# Patient Record
Sex: FEMALE | Race: WHITE | NOT HISPANIC OR LATINO | Employment: OTHER | URBAN - METROPOLITAN AREA
[De-identification: names, ages, dates, MRNs, and addresses within clinical notes are randomized per-mention and may not be internally consistent; named-entity substitution may affect disease eponyms.]

---

## 2021-02-13 DIAGNOSIS — Z23 ENCOUNTER FOR IMMUNIZATION: ICD-10-CM

## 2021-02-23 ENCOUNTER — IMMUNIZATIONS (OUTPATIENT)
Dept: FAMILY MEDICINE CLINIC | Facility: HOSPITAL | Age: 71
End: 2021-02-23

## 2021-02-23 DIAGNOSIS — Z23 ENCOUNTER FOR IMMUNIZATION: Primary | ICD-10-CM

## 2021-02-23 PROCEDURE — 0001A SARS-COV-2 / COVID-19 MRNA VACCINE (PFIZER-BIONTECH) 30 MCG: CPT

## 2021-02-23 PROCEDURE — 91300 SARS-COV-2 / COVID-19 MRNA VACCINE (PFIZER-BIONTECH) 30 MCG: CPT

## 2021-03-16 ENCOUNTER — IMMUNIZATIONS (OUTPATIENT)
Dept: FAMILY MEDICINE CLINIC | Facility: HOSPITAL | Age: 71
End: 2021-03-16

## 2021-03-16 DIAGNOSIS — Z23 ENCOUNTER FOR IMMUNIZATION: Primary | ICD-10-CM

## 2021-03-16 PROCEDURE — 91300 SARS-COV-2 / COVID-19 MRNA VACCINE (PFIZER-BIONTECH) 30 MCG: CPT

## 2021-03-16 PROCEDURE — 0002A SARS-COV-2 / COVID-19 MRNA VACCINE (PFIZER-BIONTECH) 30 MCG: CPT

## 2021-05-09 ENCOUNTER — HOSPITAL ENCOUNTER (EMERGENCY)
Facility: HOSPITAL | Age: 71
Discharge: HOME | End: 2021-05-09
Attending: EMERGENCY MEDICINE | Admitting: EMERGENCY MEDICINE
Payer: COMMERCIAL

## 2021-05-09 ENCOUNTER — APPOINTMENT (EMERGENCY)
Dept: CT IMAGING | Facility: HOSPITAL | Age: 71
End: 2021-05-09
Payer: COMMERCIAL

## 2021-05-09 VITALS
SYSTOLIC BLOOD PRESSURE: 177 MMHG | WEIGHT: 293 LBS | HEART RATE: 92 BPM | TEMPERATURE: 98.1 F | HEIGHT: 67 IN | BODY MASS INDEX: 45.99 KG/M2 | DIASTOLIC BLOOD PRESSURE: 74 MMHG | RESPIRATION RATE: 18 BRPM | OXYGEN SATURATION: 98 %

## 2021-05-09 DIAGNOSIS — K59.00 CONSTIPATION: ICD-10-CM

## 2021-05-09 DIAGNOSIS — N12 PYELONEPHRITIS: Primary | ICD-10-CM

## 2021-05-09 LAB
ALBUMIN SERPL BCP-MCNC: 3.8 G/DL (ref 3.5–5)
ALP SERPL-CCNC: 74 U/L (ref 46–116)
ALT SERPL W P-5'-P-CCNC: 31 U/L (ref 12–78)
ANION GAP SERPL CALCULATED.3IONS-SCNC: 7 MMOL/L (ref 4–13)
AST SERPL W P-5'-P-CCNC: 27 U/L (ref 5–45)
BACTERIA UR QL AUTO: ABNORMAL /HPF
BASOPHILS # BLD AUTO: 0.04 THOUSANDS/ΜL (ref 0–0.1)
BASOPHILS NFR BLD AUTO: 0 % (ref 0–1)
BILIRUB SERPL-MCNC: 0.58 MG/DL (ref 0.2–1)
BILIRUB UR QL STRIP: NEGATIVE
BUN SERPL-MCNC: 16 MG/DL (ref 5–25)
CALCIUM SERPL-MCNC: 9.8 MG/DL (ref 8.3–10.1)
CHLORIDE SERPL-SCNC: 99 MMOL/L (ref 100–108)
CLARITY UR: ABNORMAL
CO2 SERPL-SCNC: 31 MMOL/L (ref 21–32)
COLOR UR: ABNORMAL
CREAT SERPL-MCNC: 1.25 MG/DL (ref 0.6–1.3)
EOSINOPHIL # BLD AUTO: 0.23 THOUSAND/ΜL (ref 0–0.61)
EOSINOPHIL NFR BLD AUTO: 2 % (ref 0–6)
ERYTHROCYTE [DISTWIDTH] IN BLOOD BY AUTOMATED COUNT: 14.9 % (ref 11.6–15.1)
GFR SERPL CREATININE-BSD FRML MDRD: 43 ML/MIN/1.73SQ M
GLUCOSE SERPL-MCNC: 146 MG/DL (ref 65–140)
GLUCOSE UR STRIP-MCNC: NEGATIVE MG/DL
HCT VFR BLD AUTO: 39.5 % (ref 34.8–46.1)
HGB BLD-MCNC: 12.5 G/DL (ref 11.5–15.4)
HGB UR QL STRIP.AUTO: ABNORMAL
IMM GRANULOCYTES # BLD AUTO: 0.06 THOUSAND/UL (ref 0–0.2)
IMM GRANULOCYTES NFR BLD AUTO: 1 % (ref 0–2)
KETONES UR STRIP-MCNC: NEGATIVE MG/DL
LEUKOCYTE ESTERASE UR QL STRIP: ABNORMAL
LYMPHOCYTES # BLD AUTO: 1.45 THOUSANDS/ΜL (ref 0.6–4.47)
LYMPHOCYTES NFR BLD AUTO: 14 % (ref 14–44)
MCH RBC QN AUTO: 28.5 PG (ref 26.8–34.3)
MCHC RBC AUTO-ENTMCNC: 31.6 G/DL (ref 31.4–37.4)
MCV RBC AUTO: 90 FL (ref 82–98)
MONOCYTES # BLD AUTO: 0.78 THOUSAND/ΜL (ref 0.17–1.22)
MONOCYTES NFR BLD AUTO: 8 % (ref 4–12)
NEUTROPHILS # BLD AUTO: 7.48 THOUSANDS/ΜL (ref 1.85–7.62)
NEUTS SEG NFR BLD AUTO: 75 % (ref 43–75)
NITRITE UR QL STRIP: POSITIVE
NON-SQ EPI CELLS URNS QL MICRO: ABNORMAL /HPF
NRBC BLD AUTO-RTO: 0 /100 WBCS
PH UR STRIP.AUTO: 7 [PH] (ref 4.5–8)
PLATELET # BLD AUTO: 227 THOUSANDS/UL (ref 149–390)
PMV BLD AUTO: 10.3 FL (ref 8.9–12.7)
POTASSIUM SERPL-SCNC: 4.1 MMOL/L (ref 3.5–5.3)
PROT SERPL-MCNC: 8.9 G/DL (ref 6.4–8.2)
PROT UR STRIP-MCNC: ABNORMAL MG/DL
RBC # BLD AUTO: 4.38 MILLION/UL (ref 3.81–5.12)
RBC #/AREA URNS AUTO: ABNORMAL /HPF
SODIUM SERPL-SCNC: 137 MMOL/L (ref 136–145)
SP GR UR STRIP.AUTO: 1.01 (ref 1–1.03)
UROBILINOGEN UR QL STRIP.AUTO: 1 E.U./DL
WBC # BLD AUTO: 10.04 THOUSAND/UL (ref 4.31–10.16)
WBC #/AREA URNS AUTO: ABNORMAL /HPF

## 2021-05-09 PROCEDURE — 96365 THER/PROPH/DIAG IV INF INIT: CPT

## 2021-05-09 PROCEDURE — 96361 HYDRATE IV INFUSION ADD-ON: CPT

## 2021-05-09 PROCEDURE — 99284 EMERGENCY DEPT VISIT MOD MDM: CPT | Performed by: EMERGENCY MEDICINE

## 2021-05-09 PROCEDURE — 36415 COLL VENOUS BLD VENIPUNCTURE: CPT | Performed by: EMERGENCY MEDICINE

## 2021-05-09 PROCEDURE — 87086 URINE CULTURE/COLONY COUNT: CPT

## 2021-05-09 PROCEDURE — 87186 SC STD MICRODIL/AGAR DIL: CPT

## 2021-05-09 PROCEDURE — 74177 CT ABD & PELVIS W/CONTRAST: CPT

## 2021-05-09 PROCEDURE — 87040 BLOOD CULTURE FOR BACTERIA: CPT | Performed by: EMERGENCY MEDICINE

## 2021-05-09 PROCEDURE — 85025 COMPLETE CBC W/AUTO DIFF WBC: CPT | Performed by: EMERGENCY MEDICINE

## 2021-05-09 PROCEDURE — 99284 EMERGENCY DEPT VISIT MOD MDM: CPT

## 2021-05-09 PROCEDURE — 81001 URINALYSIS AUTO W/SCOPE: CPT

## 2021-05-09 PROCEDURE — 80053 COMPREHEN METABOLIC PANEL: CPT | Performed by: EMERGENCY MEDICINE

## 2021-05-09 PROCEDURE — 87077 CULTURE AEROBIC IDENTIFY: CPT

## 2021-05-09 PROCEDURE — G1004 CDSM NDSC: HCPCS

## 2021-05-09 RX ORDER — INSULIN GLARGINE 100 [IU]/ML
22 INJECTION, SOLUTION SUBCUTANEOUS
COMMUNITY

## 2021-05-09 RX ORDER — CEFPODOXIME PROXETIL 200 MG/1
200 TABLET, FILM COATED ORAL 2 TIMES DAILY
Qty: 20 TABLET | Refills: 0 | Status: SHIPPED | OUTPATIENT
Start: 2021-05-09 | End: 2021-05-19

## 2021-05-09 RX ORDER — CYCLOBENZAPRINE HCL 10 MG
10 TABLET ORAL 2 TIMES DAILY
COMMUNITY

## 2021-05-09 RX ORDER — AMITRIPTYLINE HYDROCHLORIDE 50 MG/1
50 TABLET, FILM COATED ORAL
COMMUNITY

## 2021-05-09 RX ORDER — DICYCLOMINE HCL 20 MG
20 TABLET ORAL ONCE
Status: COMPLETED | OUTPATIENT
Start: 2021-05-09 | End: 2021-05-09

## 2021-05-09 RX ORDER — FOLIC ACID/MULTIVIT,IRON,MINER .4-18-35
1 TABLET,CHEWABLE ORAL DAILY
COMMUNITY

## 2021-05-09 RX ORDER — DORZOLAMIDE HYDROCHLORIDE AND TIMOLOL MALEATE 20; 5 MG/ML; MG/ML
1 SOLUTION/ DROPS OPHTHALMIC 2 TIMES DAILY
COMMUNITY

## 2021-05-09 RX ORDER — VALSARTAN 160 MG/1
160 TABLET ORAL DAILY
COMMUNITY

## 2021-05-09 RX ORDER — LEVOTHYROXINE SODIUM 137 UG/1
CAPSULE ORAL
COMMUNITY

## 2021-05-09 RX ORDER — MULTIVITAMIN WITH IRON
100 TABLET ORAL DAILY
COMMUNITY

## 2021-05-09 RX ORDER — FUROSEMIDE 40 MG/1
40 TABLET ORAL 2 TIMES DAILY
COMMUNITY

## 2021-05-09 RX ORDER — DIPHENHYDRAMINE HYDROCHLORIDE 25 MG/1
TABLET ORAL
COMMUNITY

## 2021-05-09 RX ORDER — DOCUSATE SODIUM 100 MG/1
100 CAPSULE, LIQUID FILLED ORAL EVERY 12 HOURS
Qty: 60 CAPSULE | Refills: 0 | Status: SHIPPED | OUTPATIENT
Start: 2021-05-09 | End: 2021-06-08

## 2021-05-09 RX ORDER — OXYCODONE HYDROCHLORIDE AND ACETAMINOPHEN 5; 325 MG/1; MG/1
1 TABLET ORAL EVERY 4 HOURS PRN
COMMUNITY

## 2021-05-09 RX ORDER — LOTEPREDNOL ETABONATE 5 MG/ML
1 SUSPENSION/ DROPS OPHTHALMIC 2 TIMES DAILY
COMMUNITY

## 2021-05-09 RX ORDER — LATANOPROST 50 UG/ML
1 SOLUTION/ DROPS OPHTHALMIC
COMMUNITY

## 2021-05-09 RX ADMIN — IOHEXOL 100 ML: 350 INJECTION, SOLUTION INTRAVENOUS at 09:21

## 2021-05-09 RX ADMIN — POLYETHYLENE GLYCOL 3350, SODIUM SULFATE ANHYDROUS, SODIUM BICARBONATE, SODIUM CHLORIDE, POTASSIUM CHLORIDE 1000 ML: 236; 22.74; 6.74; 5.86; 2.97 POWDER, FOR SOLUTION ORAL at 12:29

## 2021-05-09 RX ADMIN — SODIUM CHLORIDE 1000 ML: 0.9 INJECTION, SOLUTION INTRAVENOUS at 09:06

## 2021-05-09 RX ADMIN — CEFTRIAXONE SODIUM 1000 MG: 10 INJECTION, POWDER, FOR SOLUTION INTRAVENOUS at 09:22

## 2021-05-09 RX ADMIN — DICYCLOMINE HYDROCHLORIDE 20 MG: 20 TABLET ORAL at 10:37

## 2021-05-09 NOTE — ED PROVIDER NOTES
History  Chief Complaint   Patient presents with    Constipation     Pt unable to have BM since Friday (was here then for same issue and had BM before being seen by provider and left)  Also c/o left flank pain    Flank Pain     HPI     80-year-old female with history of hypertension, diabetes, and chronic low back pain, presenting for evaluation of constipation  Patient states that she has been unable to have a normal bowel movement for the last several days which is very unusual for her  She had a small bowel movement 2 days ago and then another one this morning, both of which were round and hard  She feels like there is stool stuck in her rectum that she is unable to get out  Reports nausea but no vomiting  Reports intense discomfort to the left lower quadrant and left flank  No history of abdominal surgeries  No dysuria, frequency, or rectal bleeding  Denies vaginal bleeding or discharge  No fevers or chills  Of note, patient takes oxycodone daily chronically for her low back pain  Prior to Admission Medications   Prescriptions Last Dose Informant Patient Reported? Taking?    Biotin (Biotin 5000) 5 MG CAPS 2021 at Unknown time  Yes Yes   Sig: Take by mouth   Cholecalciferol (Vitamin D3) 50 MCG (2000 UT) CHEW 2021 at Unknown time Self Yes Yes   Sig: Chew   Levothyroxine Sodium 137 MCG CAPS 2021 at Unknown time Self Yes Yes   Sig: Take by mouth   Potassium 99 MG TABS 2021 at Unknown time Self Yes Yes   Sig: Take by mouth   amitriptyline (ELAVIL) 50 mg tablet 2021 at Unknown time Self Yes Yes   Sig: Take 50 mg by mouth daily at bedtime   apixaban (Eliquis) 5 mg 2021 at Unknown time Self Yes Yes   Sig: Take 5 mg by mouth 2 (two) times a day   cyclobenzaprine (FLEXERIL) 10 mg tablet 2021 at Unknown time Self Yes Yes   Sig: Take 10 mg by mouth 2 (two) times a day   dorzolamide-timolol (COSOPT) 22 3-6 8 MG/ML ophthalmic solution 2021 at Unknown time Self Yes Yes   Si drop 2 (two) times a day   furosemide (LASIX) 40 mg tablet 5/8/2021 at Unknown time Self Yes Yes   Sig: Take 40 mg by mouth 2 (two) times a day   insulin glargine (LANTUS) 100 units/mL subcutaneous injection 5/8/2021 at Unknown time Self Yes Yes   Sig: Inject 22 Units under the skin daily at bedtime   latanoprost (XALATAN) 0 005 % ophthalmic solution 5/8/2021 at Unknown time Self Yes Yes   Sig: Administer 1 drop to both eyes daily at bedtime   loteprednol etabonate (LOTEMAX) 0 5 % ophthalmic suspension 5/8/2021 at Unknown time  Yes Yes   Sig: Administer 1 drop to both eyes 2 (two) times a day   metFORMIN (GLUCOPHAGE) 500 mg tablet 5/8/2021 at Unknown time Self Yes Yes   Sig: Take 500 mg by mouth 2 (two) times a day with meals   metoprolol tartrate (LOPRESSOR) 25 mg tablet 5/8/2021 at Unknown time Self Yes Yes   Sig: Take 25 mg by mouth every 12 (twelve) hours   multivitamin-iron-minerals-folic acid (CENTRUM) chewable tablet 5/8/2021 at Unknown time Self Yes Yes   Sig: Chew 1 tablet daily   oxyCODONE-acetaminophen (PERCOCET) 5-325 mg per tablet 5/8/2021 at Unknown time Self Yes Yes   Sig: Take 1 tablet by mouth every 4 (four) hours as needed for moderate pain   pyridoxine (VITAMIN B6) 100 mg tablet 5/8/2021 at Unknown time Self Yes Yes   Sig: Take 100 mg by mouth daily   valsartan (DIOVAN) 160 mg tablet 5/8/2021 at Unknown time Self Yes Yes   Sig: Take 160 mg by mouth daily      Facility-Administered Medications: None       Past Medical History:   Diagnosis Date    Diabetes mellitus ([address] )     Hypertension        Past Surgical History:   Procedure Laterality Date    REPLACEMENT TOTAL KNEE Bilateral     TOTAL HIP ARTHROPLASTY Left        History reviewed  No pertinent family history  I have reviewed and agree with the history as documented      E-Cigarette/Vaping    E-Cigarette Use Never User      E-Cigarette/Vaping Substances     Social History     Tobacco Use    Smoking status: Never Smoker    Smokeless tobacco: Never Used   Substance Use Topics    Alcohol use: Never     Frequency: Never    Drug use: Never       Review of Systems   Constitutional: Negative for chills and fever  HENT: Negative for congestion  Eyes: Negative for visual disturbance  Respiratory: Negative for cough and shortness of breath  Cardiovascular: Negative for chest pain and leg swelling  Gastrointestinal: Positive for abdominal pain, constipation and nausea  Negative for blood in stool, diarrhea and vomiting  Genitourinary: Positive for flank pain (L flank)  Negative for dysuria, frequency, vaginal bleeding and vaginal discharge  Musculoskeletal: Negative for arthralgias, back pain, neck pain and neck stiffness  Skin: Negative for rash  Neurological: Negative for weakness, numbness and headaches  Psychiatric/Behavioral: Negative for agitation, behavioral problems and confusion  Physical Exam  Physical Exam  Constitutional:       General: She is not in acute distress  Appearance: She is well-developed  She is not diaphoretic  HENT:      Head: Normocephalic and atraumatic  Right Ear: External ear normal       Left Ear: External ear normal       Nose: Nose normal    Eyes:      Conjunctiva/sclera: Conjunctivae normal    Neck:      Musculoskeletal: Normal range of motion and neck supple  Cardiovascular:      Rate and Rhythm: Normal rate and regular rhythm  Heart sounds: Normal heart sounds  No murmur  No friction rub  No gallop  Pulmonary:      Effort: Pulmonary effort is normal  No respiratory distress  Breath sounds: Normal breath sounds  No wheezing or rales  Abdominal:      General: Bowel sounds are normal  There is no distension  Palpations: Abdomen is soft  Tenderness: There is abdominal tenderness (LLQ)  There is left CVA tenderness  There is no right CVA tenderness or guarding     Genitourinary:     Comments: No palpable stool in the rectal vault, no gross blood  Musculoskeletal: Normal range of motion  General: No deformity  Skin:     General: Skin is warm and dry  Neurological:      Mental Status: She is alert and oriented to person, place, and time  Motor: No abnormal muscle tone  Vital Signs  ED Triage Vitals [05/09/21 0607]   Temperature Pulse Respirations Blood Pressure SpO2   98 1 °F (36 7 °C) 97 18 135/95 99 %      Temp Source Heart Rate Source Patient Position - Orthostatic VS BP Location FiO2 (%)   Oral Monitor Lying Right arm --      Pain Score       --           Vitals:    05/09/21 0607 05/09/21 0800 05/09/21 1000 05/09/21 1230   BP: 135/95 (!) 182/109 (!) 184/88 (!) 177/74   Pulse: 97 88 80 92   Patient Position - Orthostatic VS: Lying Lying Lying Lying         Visual Acuity  Visual Acuity      Most Recent Value   L Pupil Size (mm)  3   R Pupil Size (mm)  3          ED Medications  Medications   sodium chloride 0 9 % bolus 1,000 mL (0 mL Intravenous Stopped 5/9/21 1106)   ceftriaxone (ROCEPHIN) 1 g/50 mL in dextrose IVPB (0 mg Intravenous Stopped 5/9/21 1037)   iohexol (OMNIPAQUE) 350 MG/ML injection (SINGLE-DOSE) 100 mL (100 mL Intravenous Given 5/9/21 0921)   dicyclomine (BENTYL) tablet 20 mg (20 mg Oral Given 5/9/21 1037)   polyethylene glycol (GOLYTELY) bowel prep 1,000 mL (1,000 mL Oral Given 5/9/21 1229)       Diagnostic Studies  Results Reviewed     Procedure Component Value Units Date/Time    Blood culture [[id number]] Collected: 05/09/21 0908    Lab Status: Preliminary result Specimen: Blood from Arm, Right Updated: 05/09/21 1301     Blood Culture Received in Microbiology Lab  Culture in Progress  Blood culture [[id number]] Collected: 05/09/21 4180    Lab Status: Preliminary result Specimen: Blood from Arm, Right Updated: 05/09/21 1301     Blood Culture Received in Microbiology Lab  Culture in Progress      Urine Microscopic [[id number]]  (Abnormal) Collected: 05/09/21 0752    Lab Status: Final result Specimen: Urine, Clean Catch Updated: 05/09/21 0836     RBC, UA 0-1 /hpf      WBC, UA Innumerable /hpf      Epithelial Cells Occasional /hpf      Bacteria, UA Innumerable /hpf     Urine culture [[id number]] Collected: 05/09/21 0752    Lab Status:  In process Specimen: Urine, Clean Catch Updated: 05/09/21 0836    Urine Macroscopic, POC [[id number]]  (Abnormal) Collected: 05/09/21 0752    Lab Status: Final result Specimen: Urine Updated: 05/09/21 0753     Color, UA Arlyn     Clarity, UA Cloudy     pH, UA 7 0     Leukocytes, UA Small     Nitrite, UA Positive     Protein, UA 30 (1+) mg/dl      Glucose, UA Negative mg/dl      Ketones, UA Negative mg/dl      Urobilinogen, UA 1 0 E U /dl      Bilirubin, UA Negative     Blood, UA Trace     Specific [place], UA 1 015    Narrative:      CLINITEK RESULT    Comprehensive metabolic panel [[id number]]  (Abnormal) Collected: 05/09/21 0655    Lab Status: Final result Specimen: Blood from Arm, Right Updated: 05/09/21 0721     Sodium 137 mmol/L      Potassium 4 1 mmol/L      Chloride 99 mmol/L      CO2 31 mmol/L      ANION GAP 7 mmol/L      BUN 16 mg/dL      Creatinine 1 25 mg/dL      Glucose 146 mg/dL      Calcium 9 8 mg/dL      AST 27 U/L      ALT 31 U/L      Alkaline Phosphatase 74 U/L      Total Protein 8 9 g/dL      Albumin 3 8 g/dL      Total Bilirubin 0 58 mg/dL      eGFR 43 ml/min/1 73sq m     Narrative:      [place] guidelines for Chronic Kidney Disease (CKD):     Stage 1 with normal or high GFR (GFR > 90 mL/min/1 73 square meters)    Stage 2 Mild CKD (GFR = 60-89 mL/min/1 73 square meters)    Stage 3A Moderate CKD (GFR = 45-59 mL/min/1 73 square meters)    Stage 3B Moderate CKD (GFR = 30-44 mL/min/1 73 square meters)    Stage 4 Severe CKD (GFR = 15-29 mL/min/1 73 square meters)    Stage 5 End Stage CKD (GFR <15 mL/min/1 73 square meters)  Note: GFR calculation is accurate only with a steady state creatinine    CBC and differential [[id number]] Collected: 05/09/21 7930    Lab Status: Final result Specimen: Blood from Arm, Right Updated: 05/09/21 0701     WBC 10 04 Thousand/uL      RBC 4 38 Million/uL      Hemoglobin 12 5 g/dL      Hematocrit 39 5 %      MCV 90 fL      MCH 28 5 pg      MCHC 31 6 g/dL      RDW 14 9 %      MPV 10 3 fL      Platelets 788 Thousands/uL      nRBC 0 /100 WBCs      Neutrophils Relative 75 %      Immat GRANS % 1 %      Lymphocytes Relative 14 %      Monocytes Relative 8 %      Eosinophils Relative 2 %      Basophils Relative 0 %      Neutrophils Absolute 7 48 Thousands/µL      Immature Grans Absolute 0 06 Thousand/uL      Lymphocytes Absolute 1 45 Thousands/µL      Monocytes Absolute 0 78 Thousand/µL      Eosinophils Absolute 0 23 Thousand/µL      Basophils Absolute 0 04 Thousands/µL                  CT abdomen pelvis w contrast   Final Result by Archie Mills DO (05/09 1353)   1  Mild constipation  2   Cholelithiasis without CT evidence of acute cholecystitis  Workstation performed: [id number]                    Procedures  Procedures         ED Course                             SBIRT 20yo+      Most Recent Value   SBIRT (22 yo +)   In order to provide better care to our patients, we are screening all of our patients for alcohol and drug use  Would it be okay to ask you these screening questions? Yes Filed at: 05/09/2021 0652   Initial Alcohol Screen: US AUDIT-C    1  How often do you have a drink containing alcohol?  0 Filed at: 05/09/2021 0702   2  How many drinks containing alcohol do you have on a typical day you are drinking? 0 Filed at: 05/09/2021 0702   3a  Male UNDER 65: How often do you have five or more drinks on one occasion? 0 Filed at: 05/09/2021 0702   3b  FEMALE Any Age, or MALE 65+: How often do you have 4 or more drinks on one occassion? 0 Filed at: 05/09/2021 8453   Audit-C Score  0 Filed at: 05/09/2021 3502   PRECIOUS: How many times in the past year have you       Used an illegal drug or used a prescription medication for non-medical reasons? Never Filed at: 05/09/2021 0702                    [facility]  Number of Diagnoses or Management Options  Constipation: new and requires workup  Pyelonephritis: new and requires workup  Diagnosis management comments: Nontoxic  Afebrile and hemodynamically stable  Patient has tenderness to palpation to the left flank in the left lower quadrant on exam   No fecal impaction palpable in the rectal vault  Labs with no leukocytosis, normal hemoglobin  CMP unremarkable  UA with positive nitrites  Suspect pyelonephritis as the cause of her flank pain  Patient does not meet criteria for sepsis, is tolerating oral intake without vomiting  She was given a single dose of Rocephin in the ED and prescribed a course of Vantin for further management  We discussed importance of return for fever, worsening pain, or vomiting  Due to patient's report of constipation and difficulty having bowel movements, CT scan was obtained of the abdomen pelvis with mild constipation and gallstones without evidence of acute cholecystitis  Patient was given an enema in the emergency department with production of couple of small bowel movements  Will also prescribe GoLYTELY with instructions for home bowel cleanout  As she takes oxycodone chronically for low back pain, I have also called in a prescription for Colace for her to take on a daily basis going forward to prevent further bouts of constipation         Amount and/or Complexity of Data Reviewed  Clinical lab tests: reviewed and ordered  Tests in the radiology section of CPT®: ordered and reviewed  Review and summarize past medical records: yes    Patient Progress  Patient progress: improved           Disposition  Final diagnoses:   Pyelonephritis   Constipation     Time reflects when diagnosis was documented in both MDM as applicable and the Disposition within this note     Time User Action Codes Description Comment    5/9/2021 11:31 AM Clive David Basket [N12] Pyelonephritis     5/9/2021 11:31 AM Maribell Coil Add [K59 00] Constipation       ED Disposition     ED Disposition Condition Date/Time Comment    Discharge Stable [place] May 9, 2021 11:31 AM Cindy Quinones discharge to home/self care  Follow-up Information     Follow up With Specialties Details Why Contact Info Additional [address] [facility] Emergency Department Emergency Medicine  As we discussed, return to the Emergency Department immediately for fever, worsening pain, vomiting, or for blood in your stool   [address] [facility] [id number] [place] [address] Emergency Department, [address], [place], [place], [address]          Discharge Medication List as of 5/9/2021 11:38 AM      START taking these medications    Details   cefpodoxime (VANTIN) 200 mg tablet Take 1 tablet (200 mg total) by mouth 2 (two) times a day for 10 days, Starting Sun 5/9/2021, Until Wed 5/19/2021, Normal      docusate sodium (COLACE) 100 mg capsule Take 1 capsule (100 mg total) by mouth every 12 (twelve) hours, Starting Sun 5/9/2021, Until Tue 6/8/2021, Normal         CONTINUE these medications which have NOT CHANGED    Details   amitriptyline (ELAVIL) 50 mg tablet Take 50 mg by mouth daily at bedtime, Historical Med      apixaban (Eliquis) 5 mg Take 5 mg by mouth 2 (two) times a day, Historical Med      Biotin (Biotin 5000) 5 MG CAPS Take by mouth, Historical Med      Cholecalciferol (Vitamin D3) 50 MCG (2000 UT) CHEW Chew, Historical Med      cyclobenzaprine (FLEXERIL) 10 mg tablet Take 10 mg by mouth 2 (two) times a day, Historical Med      dorzolamide-timolol (COSOPT) 22 3-6 8 MG/ML ophthalmic solution 1 drop 2 (two) times a day, Historical Med      furosemide (LASIX) 40 mg tablet Take 40 mg by mouth 2 (two) times a day, Historical Med      insulin glargine (LANTUS) 100 units/mL subcutaneous injection Inject 22 Units under the skin daily at bedtime, Historical Med      latanoprost (XALATAN) 0 005 % ophthalmic solution Administer 1 drop to both eyes daily at bedtime, Historical Med      Levothyroxine Sodium 137 MCG CAPS Take by mouth, Historical Med      loteprednol etabonate (LOTEMAX) 0 5 % ophthalmic suspension Administer 1 drop to both eyes 2 (two) times a day, Historical Med      metFORMIN (GLUCOPHAGE) 500 mg tablet Take 500 mg by mouth 2 (two) times a day with meals, Historical Med      metoprolol tartrate (LOPRESSOR) 25 mg tablet Take 25 mg by mouth every 12 (twelve) hours, Historical Med      multivitamin-iron-minerals-folic acid (CENTRUM) chewable tablet Chew 1 tablet daily, Historical Med      oxyCODONE-acetaminophen (PERCOCET) 5-325 mg per tablet Take 1 tablet by mouth every 4 (four) hours as needed for moderate pain, Historical Med      Potassium 99 MG TABS Take by mouth, Historical Med      pyridoxine (VITAMIN B6) 100 mg tablet Take 100 mg by mouth daily, Historical Med      valsartan (DIOVAN) 160 mg tablet Take 160 mg by mouth daily, Historical Med           No discharge procedures on file      PDMP Review     None          ED Provider  Electronically Signed by           Damian Jung MD  05/09/21 8868

## 2021-05-11 ENCOUNTER — HOSPITAL ENCOUNTER (EMERGENCY)
Facility: HOSPITAL | Age: 71
Discharge: HOME | End: 2021-05-11
Attending: EMERGENCY MEDICINE | Admitting: EMERGENCY MEDICINE
Payer: COMMERCIAL

## 2021-05-11 VITALS
RESPIRATION RATE: 16 BRPM | OXYGEN SATURATION: 95 % | SYSTOLIC BLOOD PRESSURE: 126 MMHG | HEART RATE: 76 BPM | TEMPERATURE: 97 F | DIASTOLIC BLOOD PRESSURE: 60 MMHG

## 2021-05-11 DIAGNOSIS — B02.9 HERPES ZOSTER WITHOUT COMPLICATION: Primary | ICD-10-CM

## 2021-05-11 LAB
BACTERIA UR CULT: ABNORMAL
BACTERIA UR QL AUTO: NORMAL /HPF
BILIRUB UR QL STRIP: NEGATIVE
CLARITY UR: CLEAR
COLOR UR: YELLOW
GLUCOSE UR STRIP-MCNC: NEGATIVE MG/DL
HGB UR QL STRIP.AUTO: ABNORMAL
KETONES UR STRIP-MCNC: NEGATIVE MG/DL
LEUKOCYTE ESTERASE UR QL STRIP: NEGATIVE
NITRITE UR QL STRIP: NEGATIVE
NON-SQ EPI CELLS URNS QL MICRO: NORMAL /HPF
PH UR STRIP.AUTO: 6.5 [PH] (ref 4.5–8)
PROT UR STRIP-MCNC: NEGATIVE MG/DL
RBC #/AREA URNS AUTO: NORMAL /HPF
SP GR UR STRIP.AUTO: 1.02 (ref 1–1.03)
UROBILINOGEN UR QL STRIP.AUTO: 0.2 E.U./DL
WBC #/AREA URNS AUTO: NORMAL /HPF

## 2021-05-11 PROCEDURE — 81001 URINALYSIS AUTO W/SCOPE: CPT

## 2021-05-11 PROCEDURE — 99284 EMERGENCY DEPT VISIT MOD MDM: CPT

## 2021-05-11 PROCEDURE — 99284 EMERGENCY DEPT VISIT MOD MDM: CPT | Performed by: EMERGENCY MEDICINE

## 2021-05-11 RX ORDER — CAPSAICIN 0.07 G/100G
CREAM TOPICAL 3 TIMES DAILY
Qty: 28.3 G | Refills: 0 | Status: SHIPPED | OUTPATIENT
Start: 2021-05-11

## 2021-05-11 RX ORDER — LIDOCAINE 40 MG/G
CREAM TOPICAL ONCE
Status: COMPLETED | OUTPATIENT
Start: 2021-05-11 | End: 2021-05-11

## 2021-05-11 RX ORDER — VALACYCLOVIR HYDROCHLORIDE 1 G/1
1000 TABLET, FILM COATED ORAL 3 TIMES DAILY
Qty: 21 TABLET | Refills: 0 | Status: SHIPPED | OUTPATIENT
Start: 2021-05-11 | End: 2021-05-18

## 2021-05-11 RX ADMIN — LIDOCAINE: 4 CREAM TOPICAL at 14:55

## 2021-05-11 NOTE — ED PROVIDER NOTES
History  Chief Complaint   Patient presents with    Flank Pain     Pt c/o L sided flank pain  Evaluated 3 separate times for same issue  Patient is a 24-year-old female with a history of diabetes and hypertension who presents with left flank pain  Patient states that she was here on Friday and Sunday of last week with similar symptoms  She was not seen on Friday because she had a bowel movement while awaiting a bed in the ED  Her pain went away and she went home  However she returns Sunday and was diagnosed with pyelonephritis and constipation  She states that her constipation has resolved  She continues to take antibiotics for her kidney infection  She complains of sharp stabbing pain in her left flank  She takes Percocet chronically for chronic pain but denies any improvement over the past few days  She denies fever, chills, nausea, vomiting, diarrhea, constipation or other complaints  History provided by:  Patient  Flank Pain  Pain location:  L flank  Pain radiates to:  Does not radiate  Pain severity:  Moderate  Duration:  5 days  Progression:  Waxing and waning  Chronicity:  New  Ineffective treatments: opioids  Associated symptoms: constipation (resolved)    Associated symptoms: no chest pain, no chills, no cough, no diarrhea, no dysuria, no fever, no hematuria, no nausea, no shortness of breath, no sore throat, no vaginal bleeding, no vaginal discharge and no vomiting        Prior to Admission Medications   Prescriptions Last Dose Informant Patient Reported? Taking?    Biotin (Biotin 5000) 5 MG CAPS   Yes No   Sig: Take by mouth   Cholecalciferol (Vitamin D3) 50 MCG (2000 UT) CHEW  Self Yes No   Sig: Chew   Levothyroxine Sodium 137 MCG CAPS  Self Yes No   Sig: Take by mouth   Potassium 99 MG TABS  Self Yes No   Sig: Take by mouth   amitriptyline (ELAVIL) 50 mg tablet  Self Yes No   Sig: Take 50 mg by mouth daily at bedtime   apixaban (Eliquis) 5 mg  Self Yes No   Sig: Take 5 mg by mouth 2 (two) times a day   cefpodoxime (VANTIN) 200 mg tablet   No No   Sig: Take 1 tablet (200 mg total) by mouth 2 (two) times a day for 10 days   cyclobenzaprine (FLEXERIL) 10 mg tablet  Self Yes No   Sig: Take 10 mg by mouth 2 (two) times a day   docusate sodium (COLACE) 100 mg capsule   No No   Sig: Take 1 capsule (100 mg total) by mouth every 12 (twelve) hours   dorzolamide-timolol (COSOPT) 22 3-6 8 MG/ML ophthalmic solution  Self Yes No   Si drop 2 (two) times a day   furosemide (LASIX) 40 mg tablet  Self Yes No   Sig: Take 40 mg by mouth 2 (two) times a day   insulin glargine (LANTUS) 100 units/mL subcutaneous injection  Self Yes No   Sig: Inject 22 Units under the skin daily at bedtime   latanoprost (XALATAN) 0 005 % ophthalmic solution  Self Yes No   Sig: Administer 1 drop to both eyes daily at bedtime   loteprednol etabonate (LOTEMAX) 0 5 % ophthalmic suspension   Yes No   Sig: Administer 1 drop to both eyes 2 (two) times a day   metFORMIN (GLUCOPHAGE) 500 mg tablet  Self Yes No   Sig: Take 500 mg by mouth 2 (two) times a day with meals   metoprolol tartrate (LOPRESSOR) 25 mg tablet  Self Yes No   Sig: Take 25 mg by mouth every 12 (twelve) hours   multivitamin-iron-minerals-folic acid (CENTRUM) chewable tablet  Self Yes No   Sig: Chew 1 tablet daily   oxyCODONE-acetaminophen (PERCOCET) 5-325 mg per tablet  Self Yes No   Sig: Take 1 tablet by mouth every 4 (four) hours as needed for moderate pain   pyridoxine (VITAMIN B6) 100 mg tablet  Self Yes No   Sig: Take 100 mg by mouth daily   valsartan (DIOVAN) 160 mg tablet  Self Yes No   Sig: Take 160 mg by mouth daily      Facility-Administered Medications: None       Past Medical History:   Diagnosis Date    Diabetes mellitus ([facility] [address] )     Hypertension        Past Surgical History:   Procedure Laterality Date    REPLACEMENT TOTAL KNEE Bilateral     TOTAL HIP ARTHROPLASTY Left        History reviewed  No pertinent family history    I have reviewed and agree with the history as documented  E-Cigarette/Vaping    E-Cigarette Use Never User      E-Cigarette/Vaping Substances     Social History     Tobacco Use    Smoking status: Never Smoker    Smokeless tobacco: Never Used   Substance Use Topics    Alcohol use: Never     Frequency: Never    Drug use: Never       Review of Systems   Constitutional: Negative for chills, diaphoresis and fever  HENT: Negative for nosebleeds, sore throat and trouble swallowing  Eyes: Negative for photophobia, pain and visual disturbance  Respiratory: Negative for cough, chest tightness and shortness of breath  Cardiovascular: Negative for chest pain, palpitations and leg swelling  Gastrointestinal: Positive for constipation (resolved)  Negative for abdominal pain, diarrhea, nausea and vomiting  Endocrine: Negative for polydipsia and polyuria  Genitourinary: Positive for flank pain  Negative for difficulty urinating, dysuria, hematuria, pelvic pain, vaginal bleeding and vaginal discharge  Musculoskeletal: Negative for back pain, neck pain and neck stiffness  Skin: Negative for pallor and rash  Neurological: Negative for dizziness, seizures, light-headedness and headaches  All other systems reviewed and are negative  Physical Exam  Physical Exam  Vitals signs and nursing note reviewed  Constitutional:       General: She is not in acute distress  Appearance: She is well-developed  HENT:      Head: Normocephalic and atraumatic  Eyes:      Pupils: Pupils are equal, round, and reactive to light  Neck:      Musculoskeletal: Normal range of motion and neck supple  Cardiovascular:      Rate and Rhythm: Normal rate and regular rhythm  Pulses: Normal pulses  Heart sounds: Normal heart sounds  Pulmonary:      Effort: Pulmonary effort is normal  No respiratory distress  Breath sounds: Normal breath sounds  Abdominal:      General: There is no distension  Palpations: Abdomen is soft   Abdomen is not rigid  Tenderness: There is no abdominal tenderness  There is no guarding or rebound  Musculoskeletal: Normal range of motion  General: No tenderness  Lymphadenopathy:      Cervical: No cervical adenopathy  Skin:     General: Skin is warm and dry  Capillary Refill: Capillary refill takes less than 2 seconds  Neurological:      Mental Status: She is alert and oriented to person, place, and time  Cranial Nerves: No cranial nerve deficit  Sensory: No sensory deficit  Vital Signs  ED Triage Vitals [05/11/21 1412]   Temperature Pulse Respirations Blood Pressure SpO2   (!) 97 °F (36 1 °C) 76 16 126/60 95 %      Temp Source Heart Rate Source Patient Position - Orthostatic VS BP Location FiO2 (%)   Temporal Monitor Sitting Left arm --      Pain Score       7           Vitals:    05/11/21 1412   BP: 126/60   Pulse: 76   Patient Position - Orthostatic VS: Sitting         Visual Acuity      ED Medications  Medications   lidocaine (LMX) 4 % cream ( Topical Given 5/11/21 1455)       Diagnostic Studies  Results Reviewed     Procedure Component Value Units Date/Time    Urine Microscopic [[id number]] Collected: 05/11/21 1438    Lab Status:  In process Specimen: Urine Updated: 05/11/21 1448    Urine Macroscopic, POC [[id number]]  (Abnormal) Collected: 05/11/21 1438    Lab Status: Final result Specimen: Urine Updated: 05/11/21 1440     Color, UA Yellow     Clarity, UA Clear     pH, UA 6 5     Leukocytes, UA Negative     Nitrite, UA Negative     Protein, UA Negative mg/dl      Glucose, UA Negative mg/dl      Ketones, UA Negative mg/dl      Urobilinogen, UA 0 2 E U /dl      Bilirubin, UA Negative     Blood, UA Trace     Specific McGee, UA 1 020    Narrative:      CLINITEK RESULT                 No orders to display              Procedures  Procedures         ED Course  ED Course as of May 11 1511   Tue May 11, 2021   1440 Creatinine clearance greater than 50 mL/min therefore no renal adjustment needed  MDM  Number of Diagnoses or Management Options  Herpes zoster without complication: new and does not require workup  Diagnosis management comments: Patient presents with left flank pain  She states that she has had pain for several days and was previously seen in the emergency department  She was treated for constipation and states that this has dramatically improved  She is also being treated for pyelonephritis  She received a dose of IV ceftriaxone in the emergency department and was discharged on p o  Antibiotics  She has been taking antibiotics and denies any urinary complaints, fevers, chills  Her urinalysis has improved  On exam, she has a vesicular rash consistent with herpes zoster  The rash is in the exact location that she describes the pain  I suspect that the pain preceded the rash by couple days  It is unclear when her symptoms of herpes zoster started given the other contributing factors of constipation and pyelonephritis  Therefore I will prescribe antivirals to hopefully prevent post herpetic neuralgia  Also recommended capsaicin cream   Patient is well-appearing and stable for discharge  I recommended outpatient follow-up with PCP  Return to ED sooner if symptoms worsen         Amount and/or Complexity of Data Reviewed  Clinical lab tests: ordered and reviewed  Review and summarize past medical records: yes  Independent visualization of images, tracings, or specimens: yes    Risk of Complications, Morbidity, and/or Mortality  Presenting problems: moderate  Diagnostic procedures: low  Management options: moderate    Patient Progress  Patient progress: stable      Disposition  Final diagnoses:   Herpes zoster without complication     Time reflects when diagnosis was documented in both MDM as applicable and the Disposition within this note     Time User Action Codes Description Comment    5/11/2021  2:40 PM Delbert Peabody, Alejandro ZHENG Add [B02 9] Herpes zoster without complication       ED Disposition     ED Disposition Condition Date/Time Comment    Discharge Stable Tue May 11, 2021  2:40 PM Eben Higgins discharge to home/self care  Follow-up Information     Follow up With Specialties Details Why [facility] Schedule an appointment as soon as possible for a visit  Return to ED sooner if symptoms worsen or persist  [address]            Patient's Medications   Discharge Prescriptions    CAPSICUM (ZOSTRIX) 0 075 % TOPICAL CREAM    Apply topically 3 (three) times a day       Start Date: 5/11/2021 End Date: --       Order Dose: --       Quantity: 28 3 g    Refills: 0    VALACYCLOVIR (VALTREX) 1,000 MG TABLET    Take 1 tablet (1,000 mg total) by mouth 3 (three) times a day for 7 days       Start Date: 5/11/2021 End Date: 5/18/2021       Order Dose: 1,000 mg       Quantity: 21 tablet    Refills: 0     No discharge procedures on file      PDMP Review     None          ED Provider  Electronically Signed by           Darryl Rodriguez DO  05/11/21 7715

## 2021-05-14 LAB
BACTERIA BLD CULT: NORMAL
BACTERIA BLD CULT: NORMAL